# Patient Record
Sex: FEMALE | Race: BLACK OR AFRICAN AMERICAN | NOT HISPANIC OR LATINO | URBAN - METROPOLITAN AREA
[De-identification: names, ages, dates, MRNs, and addresses within clinical notes are randomized per-mention and may not be internally consistent; named-entity substitution may affect disease eponyms.]

---

## 2020-10-23 ENCOUNTER — APPOINTMENT (RX ONLY)
Dept: URBAN - METROPOLITAN AREA OTHER 10 | Facility: OTHER | Age: 60
Setting detail: DERMATOLOGY
End: 2020-10-23

## 2020-10-23 DIAGNOSIS — Q826 OTHER SPECIFIED ANOMALIES OF SKIN: ICD-10-CM

## 2020-10-23 DIAGNOSIS — Q819 OTHER SPECIFIED ANOMALIES OF SKIN: ICD-10-CM

## 2020-10-23 DIAGNOSIS — L259 CONTACT DERMATITIS AND OTHER ECZEMA, UNSPECIFIED CAUSE: ICD-10-CM

## 2020-10-23 DIAGNOSIS — Q828 OTHER SPECIFIED ANOMALIES OF SKIN: ICD-10-CM

## 2020-10-23 PROBLEM — L85.8 OTHER SPECIFIED EPIDERMAL THICKENING: Status: ACTIVE | Noted: 2020-10-23

## 2020-10-23 PROBLEM — L23.9 ALLERGIC CONTACT DERMATITIS, UNSPECIFIED CAUSE: Status: ACTIVE | Noted: 2020-10-23

## 2020-10-23 PROCEDURE — 99202 OFFICE O/P NEW SF 15 MIN: CPT

## 2020-10-23 PROCEDURE — ? COUNSELING

## 2020-10-23 PROCEDURE — ? TREATMENT REGIMEN

## 2020-10-23 PROCEDURE — ? PRESCRIPTION

## 2020-10-23 RX ORDER — BETAMETHASONE DIPROPIONATE 0.5 MG/G
OINTMENT TOPICAL
Qty: 1 | Refills: 1 | Status: ERX | COMMUNITY
Start: 2020-10-23

## 2020-10-23 RX ADMIN — BETAMETHASONE DIPROPIONATE: 0.5 OINTMENT TOPICAL at 00:00

## 2020-10-23 ASSESSMENT — LOCATION SIMPLE DESCRIPTION DERM
LOCATION SIMPLE: LEFT FOREARM
LOCATION SIMPLE: LEFT SHOULDER
LOCATION SIMPLE: RIGHT SHOULDER
LOCATION SIMPLE: RIGHT POSTERIOR UPPER ARM
LOCATION SIMPLE: RIGHT FOREARM
LOCATION SIMPLE: LEFT POSTERIOR UPPER ARM

## 2020-10-23 ASSESSMENT — LOCATION DETAILED DESCRIPTION DERM
LOCATION DETAILED: LEFT VENTRAL DISTAL FOREARM
LOCATION DETAILED: RIGHT VENTRAL DISTAL FOREARM
LOCATION DETAILED: LEFT POSTERIOR SHOULDER
LOCATION DETAILED: LEFT DISTAL POSTERIOR UPPER ARM
LOCATION DETAILED: RIGHT PROXIMAL POSTERIOR UPPER ARM
LOCATION DETAILED: RIGHT POSTERIOR SHOULDER

## 2020-10-23 ASSESSMENT — SEVERITY ASSESSMENT
SEVERITY: MILD TO MODERATE
SEVERITY: MILD

## 2020-10-23 ASSESSMENT — LOCATION ZONE DERM: LOCATION ZONE: ARM

## 2020-10-23 ASSESSMENT — PAIN INTENSITY VAS: HOW INTENSE IS YOUR PAIN 0 BEING NO PAIN, 10 BEING THE MOST SEVERE PAIN POSSIBLE?: NO PAIN

## 2020-10-23 ASSESSMENT — BSA RASH: BSA RASH: 10

## 2021-04-08 ENCOUNTER — LAB OUTSIDE AN ENCOUNTER (OUTPATIENT)
Dept: URBAN - METROPOLITAN AREA CLINIC 74 | Facility: CLINIC | Age: 61
End: 2021-04-08

## 2021-04-08 ENCOUNTER — WEB ENCOUNTER (OUTPATIENT)
Dept: URBAN - METROPOLITAN AREA CLINIC 74 | Facility: CLINIC | Age: 61
End: 2021-04-08

## 2021-04-08 ENCOUNTER — OFFICE VISIT (OUTPATIENT)
Dept: URBAN - METROPOLITAN AREA CLINIC 74 | Facility: CLINIC | Age: 61
End: 2021-04-08
Payer: COMMERCIAL

## 2021-04-08 VITALS
WEIGHT: 216.9 LBS | SYSTOLIC BLOOD PRESSURE: 121 MMHG | HEIGHT: 65 IN | HEART RATE: 82 BPM | OXYGEN SATURATION: 98 % | DIASTOLIC BLOOD PRESSURE: 76 MMHG | BODY MASS INDEX: 36.14 KG/M2 | TEMPERATURE: 98.8 F

## 2021-04-08 DIAGNOSIS — K21.9 GASTROESOPHAGEAL REFLUX DISEASE, UNSPECIFIED WHETHER ESOPHAGITIS PRESENT: ICD-10-CM

## 2021-04-08 DIAGNOSIS — E66.9 OBESITY (BMI 30-39.9): ICD-10-CM

## 2021-04-08 DIAGNOSIS — R12 HEARTBURN: ICD-10-CM

## 2021-04-08 DIAGNOSIS — R05 COUGH: ICD-10-CM

## 2021-04-08 PROCEDURE — 99244 OFF/OP CNSLTJ NEW/EST MOD 40: CPT | Performed by: INTERNAL MEDICINE

## 2021-04-08 RX ORDER — DULOXETINE 60 MG/1
1 CAPSULE CAPSULE, DELAYED RELEASE PELLETS ORAL ONCE A DAY
Status: DISCONTINUED | COMMUNITY

## 2021-04-08 RX ORDER — HYDROCHLOROTHIAZIDE 25 MG/1
1 TABLET IN THE MORNING TABLET ORAL ONCE A DAY
Status: ACTIVE | COMMUNITY

## 2021-04-08 RX ORDER — PRIMIDONE 50 MG/1
1 TABLET TABLET ORAL ONCE A DAY
Status: DISCONTINUED | COMMUNITY

## 2021-04-08 RX ORDER — ESTRADIOL 0.1 MG/D
1 PATCH TO SKIN PATCH TRANSDERMAL
Status: ACTIVE | COMMUNITY

## 2021-04-08 RX ORDER — FLUOCINOLONE ACETONIDE 0.11 MG/ML
5 DROPS INTO AFFECTED EAR OIL AURICULAR (OTIC) TWICE A DAY
Status: ACTIVE | COMMUNITY

## 2021-04-08 RX ORDER — IBUPROFEN 800 MG/1
1 TABLET WITH FOOD OR MILK AS NEEDED TABLET ORAL THREE TIMES A DAY
Status: ACTIVE | COMMUNITY

## 2021-04-08 RX ORDER — PANTOPRAZOLE SODIUM 40 MG/1
1 TABLET TABLET, DELAYED RELEASE ORAL TWICE A DAY
Qty: 180 TABLET | Refills: 1 | OUTPATIENT
Start: 2021-04-08

## 2021-04-08 RX ORDER — HYDROXYCHLOROQUINE SULFATE 200 MG/1
AS DIRECTED TABLET ORAL
Status: DISCONTINUED | COMMUNITY

## 2021-04-08 RX ORDER — AMLODIPINE BESYLATE 10 MG/1
1 TABLET TABLET ORAL ONCE A DAY
Status: DISCONTINUED | COMMUNITY

## 2021-04-08 RX ORDER — AMITRIPTYLINE HYDROCHLORIDE 50 MG/1
1 TABLET AT BEDTIME TABLET, FILM COATED ORAL ONCE A DAY
Status: DISCONTINUED | COMMUNITY

## 2021-04-08 NOTE — HPI-TODAY'S VISIT:
The patient is a 60-year-old female.  The patient was referred by Dr. Hector Mcdonough for consultation.  A copy of these document is being forwarded to the referring provider.  The patient presents with a 6-month history of gastroesophageal reflux and heartburn.  The patient claims that the gastroesophageal reflux and heartburn have progressively gotten worse, currently she states during the night not to aspirate.  The patient claims that she has postprandial gastroesophageal reflux and heartburn, the patient states that she has significant gastroesophageal reflux in heartburn throughout the night, along with it she has persistent cough, at times she coughs so hard that she will regurgitate or vomit mucus with a few streaks of blood.  The patient denies having any dysphagia, odynophagia, does have nausea after coughing, rarely vomits.  The patient denied having any dysphagia, odynophagia, early satiety.  The patient denied having any unexplained weight loss.  The patient has been taking over-the-counter Nexium with poor results.  The patient has been advised to start antireflux measures and diet, along with it she will start taking pantoprazole 40 mg twice daily, the patient will be scheduled to have a barium swallow as well as an EGD.  Benefits potential complications and alternatives to the EGD have been disclosed.  The patient states that she did have a colonoscopy performed by [facility] about 6 years ago, she was told it was negative, we will obtain past medical records for review.  The patient has been advised to lose weight.

## 2021-04-26 ENCOUNTER — OFFICE VISIT (OUTPATIENT)
Dept: URBAN - METROPOLITAN AREA SURGERY CENTER 30 | Facility: SURGERY CENTER | Age: 61
End: 2021-04-26

## 2021-04-27 ENCOUNTER — OFFICE VISIT (OUTPATIENT)
Dept: URBAN - METROPOLITAN AREA SURGERY CENTER 30 | Facility: SURGERY CENTER | Age: 61
End: 2021-04-27
Payer: COMMERCIAL

## 2021-04-27 DIAGNOSIS — K29.60 ADENOPAPILLOMATOSIS GASTRICA: ICD-10-CM

## 2021-04-27 DIAGNOSIS — K22.8 COLUMNAR-LINED ESOPHAGUS: ICD-10-CM

## 2021-04-27 PROCEDURE — 43239 EGD BIOPSY SINGLE/MULTIPLE: CPT | Performed by: INTERNAL MEDICINE

## 2021-04-27 PROCEDURE — G8907 PT DOC NO EVENTS ON DISCHARG: HCPCS | Performed by: INTERNAL MEDICINE

## 2021-04-28 ENCOUNTER — TELEPHONE ENCOUNTER (OUTPATIENT)
Dept: URBAN - METROPOLITAN AREA CLINIC 74 | Facility: CLINIC | Age: 61
End: 2021-04-28

## 2021-05-11 ENCOUNTER — OFFICE VISIT (OUTPATIENT)
Dept: URBAN - METROPOLITAN AREA CLINIC 74 | Facility: CLINIC | Age: 61
End: 2021-05-11
Payer: COMMERCIAL

## 2021-05-11 ENCOUNTER — LAB OUTSIDE AN ENCOUNTER (OUTPATIENT)
Dept: URBAN - METROPOLITAN AREA CLINIC 74 | Facility: CLINIC | Age: 61
End: 2021-05-11

## 2021-05-11 ENCOUNTER — TELEPHONE ENCOUNTER (OUTPATIENT)
Dept: URBAN - METROPOLITAN AREA CLINIC 74 | Facility: CLINIC | Age: 61
End: 2021-05-11

## 2021-05-11 VITALS
WEIGHT: 206 LBS | HEIGHT: 65 IN | SYSTOLIC BLOOD PRESSURE: 118 MMHG | HEART RATE: 81 BPM | OXYGEN SATURATION: 99 % | BODY MASS INDEX: 34.32 KG/M2 | TEMPERATURE: 97.5 F | DIASTOLIC BLOOD PRESSURE: 63 MMHG

## 2021-05-11 DIAGNOSIS — E66.9 OBESITY (BMI 30-39.9): ICD-10-CM

## 2021-05-11 DIAGNOSIS — K22.4 ESOPHAGEAL DYSMOTILITY: ICD-10-CM

## 2021-05-11 DIAGNOSIS — K29.30 CHRONIC SUPERFICIAL GASTRITIS WITHOUT BLEEDING: ICD-10-CM

## 2021-05-11 DIAGNOSIS — R12 HEARTBURN: ICD-10-CM

## 2021-05-11 DIAGNOSIS — G47.30 SLEEP APNEA IN ADULT: ICD-10-CM

## 2021-05-11 DIAGNOSIS — R05 COUGH: ICD-10-CM

## 2021-05-11 DIAGNOSIS — K44.9 HIATAL HERNIA: ICD-10-CM

## 2021-05-11 DIAGNOSIS — K21.00 GASTROESOPHAGEAL REFLUX DISEASE WITH ESOPHAGITIS WITHOUT HEMORRHAGE: ICD-10-CM

## 2021-05-11 PROCEDURE — 99213 OFFICE O/P EST LOW 20 MIN: CPT | Performed by: INTERNAL MEDICINE

## 2021-05-11 RX ORDER — FLUOCINOLONE ACETONIDE 0.11 MG/ML
5 DROPS INTO AFFECTED EAR OIL AURICULAR (OTIC) TWICE A DAY
Status: ACTIVE | COMMUNITY

## 2021-05-11 RX ORDER — ESTRADIOL 0.1 MG/D
1 PATCH TO SKIN PATCH TRANSDERMAL
Status: ACTIVE | COMMUNITY

## 2021-05-11 RX ORDER — HYDROCHLOROTHIAZIDE 25 MG/1
1 TABLET IN THE MORNING TABLET ORAL ONCE A DAY
Status: ACTIVE | COMMUNITY

## 2021-05-11 RX ORDER — IBUPROFEN 800 MG/1
1 TABLET WITH FOOD OR MILK AS NEEDED TABLET ORAL THREE TIMES A DAY
Status: ACTIVE | COMMUNITY

## 2021-05-11 RX ORDER — PANTOPRAZOLE SODIUM 40 MG/1
1 TABLET TABLET, DELAYED RELEASE ORAL TWICE A DAY
OUTPATIENT
Start: 2021-04-08

## 2021-05-11 RX ORDER — PANTOPRAZOLE SODIUM 40 MG/1
1 TABLET TABLET, DELAYED RELEASE ORAL TWICE A DAY
Qty: 180 TABLET | Refills: 1 | Status: ACTIVE | COMMUNITY
Start: 2021-04-08

## 2021-05-11 NOTE — HPI-TODAY'S VISIT:
The patient is a 60-year-old female.  The patient was referred by Dr. Hector Mcdonough for consultation.  A copy of these document is being forwarded to the referring provider.  The patient presents with a 6-month history of gastroesophageal reflux and heartburn.  The patient claims that the gastroesophageal reflux and heartburn have progressively gotten worse, currently she states during the night not to aspirate.  The patient claims that she has postprandial gastroesophageal reflux and heartburn, the patient states that she has significant gastroesophageal reflux in heartburn throughout the night, along with it she has persistent cough, at times she coughs so hard that she will regurgitate or vomit mucus with a few streaks of blood.  The patient denies having any dysphagia, odynophagia, does have nausea after coughing, rarely vomits.  The patient denied having any dysphagia, odynophagia, early satiety.  The patient denied having any unexplained weight loss.  The patient has been taking over-the-counter Nexium with poor results.  The patient has been advised to start antireflux measures and diet, along with it she will start taking pantoprazole 40 mg twice daily, the patient will be scheduled to have a barium swallow as well as an EGD.  Benefits potential complications and alternatives to the EGD have been disclosed.  The patient states that she did have a colonoscopy performed by [facility] about 6 years ago, she was told it was negative, we will obtain past medical records for review.  The patient has been advised to lose weight. Today May 11, 2021 the patient returns for a follow-up visit, the patient was last seen in the office on April 8, 2021 with gastroesophageal reflux, heartburn, cough, sleep apnea and obesity. At the time of the visit the patient stated that she was taking over-the-counter Nexium with poor results, was advised to take pantoprazole 40 mg twice daily, follow antireflux measures and diet, the patient was scheduled to have both a barium swallow and an EGD. The patient's last colonoscopy according to the patient was performed about 6 years ago by [facility], she stated that it was normal, we will obtain past records for review. The barium swallow was performed on April 19, 2021 and it showed a normal oropharynx and hypopharynx, flash penetrated the airway with no aspiration, the esophagus have normal caliber and mucosal contour, there was poor esophageal motility with poor contrast stripping and esophageal contrast pooling in the esophagus, the EG junction was normal, there was no hiatal hernia, there was no reflux. The EGD was performed on March 27, 2021 and it showed normal duodenal mucosa changes suggestive of erosive gastritis, a medium-sized hiatal hernia and distal esophageal erythema. Biopsies revealed mild chronic inactive gastritis H. pylori negative, squamous epithelium with basaloid hyperplasia, negative for intestinal metaplasia and fungal elements. Today the patient returns to the office stating that she continues to have frequent gastroesophageal reflux and heartburn, the intensity of the heartburn has diminished and so has the upper abdominal discomfort related to the gastritis. I discussed at length the barium swallow endoscopic and pathology with the patient, the patient will remain on antireflux measures and diet as well as pantoprazole 40 mg twice daily and will be scheduled to have an esophageal manometry.  The patient will return for follow-up visit after the manometry is performed.

## 2021-05-13 ENCOUNTER — TELEPHONE ENCOUNTER (OUTPATIENT)
Dept: URBAN - METROPOLITAN AREA CLINIC 96 | Facility: CLINIC | Age: 61
End: 2021-05-13

## 2021-05-19 ENCOUNTER — TELEPHONE ENCOUNTER (OUTPATIENT)
Dept: URBAN - METROPOLITAN AREA CLINIC 74 | Facility: CLINIC | Age: 61
End: 2021-05-19

## 2021-06-04 ENCOUNTER — OFFICE VISIT (OUTPATIENT)
Dept: URBAN - METROPOLITAN AREA MEDICAL CENTER 28 | Facility: MEDICAL CENTER | Age: 61
End: 2021-06-04
Payer: COMMERCIAL

## 2021-06-04 DIAGNOSIS — K21.9 ACID REFLUX: ICD-10-CM

## 2021-06-04 PROCEDURE — 91010 ESOPHAGUS MOTILITY STUDY: CPT | Performed by: INTERNAL MEDICINE

## 2021-06-08 PROBLEM — 73430006: Status: ACTIVE | Noted: 2021-04-08

## 2021-06-08 PROBLEM — 162864005: Status: ACTIVE | Noted: 2021-04-08

## 2021-06-08 PROBLEM — 16331000: Status: ACTIVE | Noted: 2021-04-08

## 2021-06-08 PROBLEM — 266434009: Status: ACTIVE | Noted: 2021-05-10

## 2021-06-08 PROBLEM — 196735001: Status: ACTIVE | Noted: 2021-05-10

## 2021-06-08 PROBLEM — 49727002: Status: ACTIVE | Noted: 2021-04-08

## 2021-06-09 ENCOUNTER — OFFICE VISIT (OUTPATIENT)
Dept: URBAN - METROPOLITAN AREA CLINIC 74 | Facility: CLINIC | Age: 61
End: 2021-06-09
Payer: COMMERCIAL

## 2021-06-09 VITALS
DIASTOLIC BLOOD PRESSURE: 69 MMHG | BODY MASS INDEX: 35.19 KG/M2 | SYSTOLIC BLOOD PRESSURE: 120 MMHG | HEART RATE: 80 BPM | WEIGHT: 211.2 LBS | HEIGHT: 65 IN | TEMPERATURE: 97.3 F | OXYGEN SATURATION: 100 %

## 2021-06-09 DIAGNOSIS — K29.30 CHRONIC SUPERFICIAL GASTRITIS WITHOUT BLEEDING: ICD-10-CM

## 2021-06-09 DIAGNOSIS — G47.30 SLEEP APNEA IN ADULT: ICD-10-CM

## 2021-06-09 DIAGNOSIS — E66.9 OBESITY (BMI 30-39.9): ICD-10-CM

## 2021-06-09 DIAGNOSIS — R12 HEARTBURN: ICD-10-CM

## 2021-06-09 DIAGNOSIS — R05 COUGH: ICD-10-CM

## 2021-06-09 DIAGNOSIS — K44.9 HIATAL HERNIA: ICD-10-CM

## 2021-06-09 DIAGNOSIS — K21.00 GASTROESOPHAGEAL REFLUX DISEASE WITH ESOPHAGITIS WITHOUT HEMORRHAGE: ICD-10-CM

## 2021-06-09 DIAGNOSIS — K22.4 ESOPHAGEAL DYSMOTILITY: ICD-10-CM

## 2021-06-09 PROCEDURE — 99214 OFFICE O/P EST MOD 30 MIN: CPT | Performed by: INTERNAL MEDICINE

## 2021-06-09 RX ORDER — IBUPROFEN 800 MG/1
1 TABLET WITH FOOD OR MILK AS NEEDED TABLET ORAL THREE TIMES A DAY
Status: DISCONTINUED | COMMUNITY

## 2021-06-09 RX ORDER — FLUOCINOLONE ACETONIDE 0.11 MG/ML
5 DROPS INTO AFFECTED EAR OIL AURICULAR (OTIC) TWICE A DAY
Status: DISCONTINUED | COMMUNITY

## 2021-06-09 RX ORDER — HYDROCHLOROTHIAZIDE 25 MG/1
1 TABLET IN THE MORNING TABLET ORAL ONCE A DAY
Status: ACTIVE | COMMUNITY

## 2021-06-09 RX ORDER — PANTOPRAZOLE SODIUM 40 MG/1
1 TABLET TABLET, DELAYED RELEASE ORAL TWICE A DAY
OUTPATIENT

## 2021-06-09 RX ORDER — ESTRADIOL 0.1 MG/D
1 PATCH TO SKIN PATCH TRANSDERMAL
Status: ACTIVE | COMMUNITY

## 2021-06-09 RX ORDER — PANTOPRAZOLE SODIUM 40 MG/1
1 TABLET TABLET, DELAYED RELEASE ORAL TWICE A DAY
Status: ACTIVE | COMMUNITY
Start: 2021-04-08

## 2021-06-09 NOTE — HPI-TODAY'S VISIT:
The patient is a 60-year-old female.  The patient was referred by Dr. Hector Mcdonough for consultation.  A copy of these document is being forwarded to the referring provider.  The patient presents with a 6-month history of gastroesophageal reflux and heartburn.  The patient claims that the gastroesophageal reflux and heartburn have progressively gotten worse, currently she states during the night not to aspirate.  The patient claims that she has postprandial gastroesophageal reflux and heartburn, the patient states that she has significant gastroesophageal reflux in heartburn throughout the night, along with it she has persistent cough, at times she coughs so hard that she will regurgitate or vomit mucus with a few streaks of blood.  The patient denies having any dysphagia, odynophagia, does have nausea after coughing, rarely vomits.  The patient denied having any dysphagia, odynophagia, early satiety.  The patient denied having any unexplained weight loss.  The patient has been taking over-the-counter Nexium with poor results.  The patient has been advised to start antireflux measures and diet, along with it she will start taking pantoprazole 40 mg twice daily, the patient will be scheduled to have a barium swallow as well as an EGD.  Benefits potential complications and alternatives to the EGD have been disclosed.  The patient states that she did have a colonoscopy performed by [facility] about 6 years ago, she was told it was negative, we will obtain past medical records for review.  The patient has been advised to lose weight. Today May 11, 2021 the patient returns for a follow-up visit, the patient was last seen in the office on April 8, 2021 with gastroesophageal reflux, heartburn, cough, sleep apnea and obesity. At the time of the visit the patient stated that she was taking over-the-counter Nexium with poor results, was advised to take pantoprazole 40 mg twice daily, follow antireflux measures and diet, the patient was scheduled to have both a barium swallow and an EGD. The patient's last colonoscopy according to the patient was performed about 6 years ago by [facility], she stated that it was normal, we will obtain past records for review. The barium swallow was performed on April 19, 2021 and it showed a normal oropharynx and hypopharynx, flash penetrated the airway with no aspiration, the esophagus have normal caliber and mucosal contour, there was poor esophageal motility with poor contrast stripping and esophageal contrast pooling in the esophagus, the EG junction was normal, there was no hiatal hernia, there was no reflux. The EGD was performed on March 27, 2021 and it showed normal duodenal mucosa changes suggestive of erosive gastritis, a medium-sized hiatal hernia and distal esophageal erythema. Biopsies revealed mild chronic inactive gastritis H. pylori negative, squamous epithelium with basaloid hyperplasia, negative for intestinal metaplasia and fungal elements. Today the patient returns to the office stating that she continues to have frequent gastroesophageal reflux and heartburn, the intensity of the heartburn has diminished and so has the upper abdominal discomfort related to the gastritis. I discussed at length the barium swallow endoscopic and pathology with the patient, the patient will remain on antireflux measures and diet as well as pantoprazole 40 mg twice daily and will be scheduled to have an esophageal manometry.  The patient will return for follow-up visit after the manometry is performed. Today June 9, 2021 the patient returns for a follow-up visit, the patient was last seen in the office on May 11, 2021 with gastroesophageal reflux with esophagitis, esophageal dysmotility, a hiatal hernia, heartburn, chronic superficial gastritis, sleep apnea, cough and obesity. At the time of the last visit the patient continued to have frequent gastroesophageal reflux and heartburn, the intensity of the heartburn had diminished and so far the upper abdominal discomfort related to the gastritis was better. I discussed at the time with the patient the barium swallow and endoscopy results as well as the pathology report.  The patient was advised to remain on antireflux measures and diet as well as pantoprazole 40 mg daily.  The patient was scheduled to have an esophageal manometry. The esophageal manometry was performed on June 4, 2021 and it revealed a hypotensive resting LES, normal peristaltic patterns but 8 out of 10 swallows with diminished weak peristalsis, there was abnormal bolus transit for liquids, there was a double high pressure zone noted at the GE junction indicating the presence of a hiatal hernia.  Overall it was compatible with moderate ineffective esophageal motility. The previous barium swallow performed on April 19, 2021 showed flash penetration into the upper airway with no aspiration, normal esophageal caliber and mucosal contour, poor esophageal motility with poor contrast stripping and esophageal contrast pooling in the esophagus, the EG junction was normal and there was no hiatal hernia.  The EGD revealed chronic inactive gastritis H. pylori negative, squamous epithelium with baseloid hyperplasia negative for intestinal metaplasia. Today the patient returns to the office stating that she has made some diet modifications and by doing so she is having less reflux, fewer episodes of dysphagia and cough, the patient claims that in view of it she has taken the pantoprazole only sporadically. We discussed at length the above findings, the patient is pretty much aware that she has ineffective esophageal motility, we discussed potential treatments with either amitriptyline, nortriptyline.  We also discussed the use of metoclopramide to encourage gastric emptying and reduce some of her reflux. Currently the patient states that since her diet seems to have improved her she would rather avoid medications for now.  The patient will return in 90 days for a follow-up visit or as needed, if more symptomatic she might agree to initiate treatment.  The patient is pretty much aware that none of these medications have proven 200% to resolve her problem.

## 2021-08-30 ENCOUNTER — DASHBOARD ENCOUNTERS (OUTPATIENT)
Age: 61
End: 2021-08-30

## 2021-09-09 ENCOUNTER — OFFICE VISIT (OUTPATIENT)
Dept: URBAN - METROPOLITAN AREA CLINIC 74 | Facility: CLINIC | Age: 61
End: 2021-09-09

## 2021-09-09 RX ORDER — ESTRADIOL 0.1 MG/D
1 PATCH TO SKIN PATCH TRANSDERMAL
Status: ACTIVE | COMMUNITY

## 2021-09-09 RX ORDER — HYDROCHLOROTHIAZIDE 25 MG/1
1 TABLET IN THE MORNING TABLET ORAL ONCE A DAY
Status: ACTIVE | COMMUNITY

## 2021-09-09 RX ORDER — PANTOPRAZOLE SODIUM 40 MG/1
1 TABLET TABLET, DELAYED RELEASE ORAL TWICE A DAY
Status: ACTIVE | COMMUNITY